# Patient Record
Sex: MALE | Race: WHITE | HISPANIC OR LATINO
[De-identification: names, ages, dates, MRNs, and addresses within clinical notes are randomized per-mention and may not be internally consistent; named-entity substitution may affect disease eponyms.]

---

## 2017-03-27 ENCOUNTER — APPOINTMENT (OUTPATIENT)
Dept: DERMATOLOGY | Facility: CLINIC | Age: 47
End: 2017-03-27

## 2017-05-18 ENCOUNTER — OUTPATIENT (OUTPATIENT)
Dept: OUTPATIENT SERVICES | Facility: HOSPITAL | Age: 47
LOS: 1 days | End: 2017-05-18
Payer: COMMERCIAL

## 2017-05-18 VITALS
OXYGEN SATURATION: 96 % | WEIGHT: 175.93 LBS | TEMPERATURE: 98 F | HEIGHT: 70 IN | SYSTOLIC BLOOD PRESSURE: 104 MMHG | RESPIRATION RATE: 18 BRPM | HEART RATE: 70 BPM | DIASTOLIC BLOOD PRESSURE: 60 MMHG

## 2017-05-18 VITALS
WEIGHT: 175.93 LBS | SYSTOLIC BLOOD PRESSURE: 104 MMHG | HEIGHT: 70 IN | DIASTOLIC BLOOD PRESSURE: 60 MMHG | HEART RATE: 70 BPM | TEMPERATURE: 98 F | RESPIRATION RATE: 16 BRPM

## 2017-05-18 DIAGNOSIS — Z98.890 OTHER SPECIFIED POSTPROCEDURAL STATES: Chronic | ICD-10-CM

## 2017-05-18 DIAGNOSIS — Z01.810 ENCOUNTER FOR PREPROCEDURAL CARDIOVASCULAR EXAMINATION: ICD-10-CM

## 2017-05-18 LAB
ANION GAP SERPL CALC-SCNC: 13 MMOL/L — SIGNIFICANT CHANGE UP (ref 5–17)
BUN SERPL-MCNC: 26 MG/DL — HIGH (ref 8–20)
CALCIUM SERPL-MCNC: 9.1 MG/DL — SIGNIFICANT CHANGE UP (ref 8.6–10.2)
CHLORIDE SERPL-SCNC: 103 MMOL/L — SIGNIFICANT CHANGE UP (ref 98–107)
CO2 SERPL-SCNC: 23 MMOL/L — SIGNIFICANT CHANGE UP (ref 22–29)
CREAT SERPL-MCNC: 0.8 MG/DL — SIGNIFICANT CHANGE UP (ref 0.5–1.3)
GLUCOSE SERPL-MCNC: 101 MG/DL — SIGNIFICANT CHANGE UP (ref 70–115)
HCT VFR BLD CALC: 43 % — SIGNIFICANT CHANGE UP (ref 42–52)
HGB BLD-MCNC: 14.8 G/DL — SIGNIFICANT CHANGE UP (ref 14–18)
INR BLD: 1.09 RATIO — SIGNIFICANT CHANGE UP (ref 0.88–1.16)
MAGNESIUM SERPL-MCNC: 2.3 MG/DL — SIGNIFICANT CHANGE UP (ref 1.6–2.6)
MCHC RBC-ENTMCNC: 30.8 PG — SIGNIFICANT CHANGE UP (ref 27–31)
MCHC RBC-ENTMCNC: 34.4 G/DL — SIGNIFICANT CHANGE UP (ref 32–36)
MCV RBC AUTO: 89.4 FL — SIGNIFICANT CHANGE UP (ref 80–94)
PLATELET # BLD AUTO: 164 K/UL — SIGNIFICANT CHANGE UP (ref 150–400)
POTASSIUM SERPL-MCNC: 4.2 MMOL/L — SIGNIFICANT CHANGE UP (ref 3.5–5.3)
POTASSIUM SERPL-SCNC: 4.2 MMOL/L — SIGNIFICANT CHANGE UP (ref 3.5–5.3)
PROTHROM AB SERPL-ACNC: 12 SEC — SIGNIFICANT CHANGE UP (ref 9.8–12.7)
RBC # BLD: 4.81 M/UL — SIGNIFICANT CHANGE UP (ref 4.6–6.2)
RBC # FLD: 12.6 % — SIGNIFICANT CHANGE UP (ref 11–15.6)
SODIUM SERPL-SCNC: 139 MMOL/L — SIGNIFICANT CHANGE UP (ref 135–145)
TYPE + AB SCN PNL BLD: SIGNIFICANT CHANGE UP
WBC # BLD: 4.4 K/UL — LOW (ref 4.8–10.8)
WBC # FLD AUTO: 4.4 K/UL — LOW (ref 4.8–10.8)

## 2017-05-18 PROCEDURE — 93010 ELECTROCARDIOGRAM REPORT: CPT

## 2017-05-18 PROCEDURE — 80048 BASIC METABOLIC PNL TOTAL CA: CPT

## 2017-05-18 PROCEDURE — 86850 RBC ANTIBODY SCREEN: CPT

## 2017-05-18 PROCEDURE — 85027 COMPLETE CBC AUTOMATED: CPT

## 2017-05-18 PROCEDURE — 93005 ELECTROCARDIOGRAM TRACING: CPT

## 2017-05-18 PROCEDURE — 36415 COLL VENOUS BLD VENIPUNCTURE: CPT

## 2017-05-18 PROCEDURE — G0463: CPT

## 2017-05-18 PROCEDURE — 86901 BLOOD TYPING SEROLOGIC RH(D): CPT

## 2017-05-18 PROCEDURE — 86900 BLOOD TYPING SEROLOGIC ABO: CPT

## 2017-05-18 PROCEDURE — 85610 PROTHROMBIN TIME: CPT

## 2017-05-18 PROCEDURE — 83735 ASSAY OF MAGNESIUM: CPT

## 2017-05-18 NOTE — H&P PST ADULT - HISTORY OF PRESENT ILLNESS
47 yo male without significant medical history who presents for [facility] for elective EPS/PVC ablation. He has had intermittent palpitations x 7 years, that have been increasing to daily over the last 3 months. He denies associated chest pain, LE edema, syncope or near syncope, but admits to feeling anxious and irritated with them. He denies caffeine or etoh. He was using marijuana recreationally but discontinued it a few months ago without change in his symptoms. He was prescribed flecainide but had a mix up with his medication and did not start it. He was taking atenolol without relief.    Holter Monitor 3/29/2017: 45% PVC burden  Nuclear stress 3/15/17: negative for ischemia  TTE 3/13/17: nml LVFx 45 yo male without significant medical history who presents for [facility] for elective EPS/PVC ablation. He has had intermittent palpitations x 7 years, that have been increasing to daily over the last 3 months. He denies associated chest pain, LE edema, syncope or near syncope, but admits to feeling anxious and irritated with them. He denies caffeine or etoh. He was using marijuana recreationally but discontinued it a few months ago without change in his symptoms. He was prescribed flecainide but had a mix up with his medication and did not start it. He was taking atenolol without relief.  Biological parents and siblings are alive and well, he denies family hx of SCD     Holter Monitor 3/29/2017: 45% PVC burden  Nuclear stress 3/15/17: negative for ischemia  TTE 3/13/17: nml LVFx

## 2017-05-18 NOTE — H&P PST ADULT - PMH
HLD (hyperlipidemia)  diet controlled  Palpitations    PVC (premature ventricular contraction)    Ventricular trigeminy

## 2017-05-18 NOTE — H&P PST ADULT - ASSESSMENT
47 yo male without significant medical history who presents for PST for elective EPS/PVC ablation. He has had intermittent palpitations x 7 years, that have been increasing to daily over the last 3 months. He denies associated chest pain, LE edema, syncope or near syncope, but admits to feeling anxious and irritated with them. He denies caffeine or etoh. He was using marijuana recreationally but discontinued it a few months ago without change in his symptoms. He was prescribed flecainide but had a mix up with his medication and did not start it. He was taking atenolol without relief.    _pt self discontinued atenolol x 5 days, will continue to hold for ablation

## 2017-05-23 ENCOUNTER — INPATIENT (INPATIENT)
Facility: HOSPITAL | Age: 47
LOS: 0 days | Discharge: HOME | DRG: 274 | End: 2017-05-24
Attending: STUDENT IN AN ORGANIZED HEALTH CARE EDUCATION/TRAINING PROGRAM | Admitting: STUDENT IN AN ORGANIZED HEALTH CARE EDUCATION/TRAINING PROGRAM
Payer: COMMERCIAL

## 2017-05-23 VITALS
OXYGEN SATURATION: 98 % | SYSTOLIC BLOOD PRESSURE: 127 MMHG | TEMPERATURE: 98 F | DIASTOLIC BLOOD PRESSURE: 59 MMHG | HEART RATE: 76 BPM | RESPIRATION RATE: 16 BRPM

## 2017-05-23 DIAGNOSIS — Z98.890 OTHER SPECIFIED POSTPROCEDURAL STATES: Chronic | ICD-10-CM

## 2017-05-23 DIAGNOSIS — Z01.810 ENCOUNTER FOR PREPROCEDURAL CARDIOVASCULAR EXAMINATION: ICD-10-CM

## 2017-05-23 DIAGNOSIS — I49.3 VENTRICULAR PREMATURE DEPOLARIZATION: ICD-10-CM

## 2017-05-23 LAB
BLD GP AB SCN SERPL QL: SIGNIFICANT CHANGE UP
TYPE + AB SCN PNL BLD: SIGNIFICANT CHANGE UP

## 2017-05-23 PROCEDURE — 93010 ELECTROCARDIOGRAM REPORT: CPT

## 2017-05-23 PROCEDURE — 93662 INTRACARDIAC ECG (ICE): CPT | Mod: 26

## 2017-05-23 PROCEDURE — 93655 ICAR CATH ABLTJ DSCRT ARRHYT: CPT

## 2017-05-23 PROCEDURE — 93654 COMPRE EP EVAL TX VT: CPT

## 2017-05-23 RX ORDER — ASPIRIN/CALCIUM CARB/MAGNESIUM 324 MG
325 TABLET ORAL DAILY
Qty: 0 | Refills: 0 | Status: DISCONTINUED | OUTPATIENT
Start: 2017-05-24 | End: 2017-05-24

## 2017-05-23 RX ORDER — ONDANSETRON 8 MG/1
4 TABLET, FILM COATED ORAL EVERY 6 HOURS
Qty: 0 | Refills: 0 | Status: DISCONTINUED | OUTPATIENT
Start: 2017-05-23 | End: 2017-05-24

## 2017-05-23 RX ORDER — ASPIRIN/CALCIUM CARB/MAGNESIUM 324 MG
1 TABLET ORAL
Qty: 30 | Refills: 0 | OUTPATIENT
Start: 2017-05-23 | End: 2017-06-22

## 2017-05-23 RX ORDER — BENZOCAINE AND MENTHOL 5; 1 G/100ML; G/100ML
1 LIQUID ORAL
Qty: 0 | Refills: 0 | Status: DISCONTINUED | OUTPATIENT
Start: 2017-05-23 | End: 2017-05-24

## 2017-05-23 RX ORDER — ATENOLOL 25 MG/1
1 TABLET ORAL
Qty: 0 | Refills: 0 | COMMUNITY

## 2017-05-23 RX ORDER — ACETAMINOPHEN 500 MG
650 TABLET ORAL EVERY 6 HOURS
Qty: 0 | Refills: 0 | Status: DISCONTINUED | OUTPATIENT
Start: 2017-05-23 | End: 2017-05-24

## 2017-05-23 NOTE — DISCHARGE NOTE ADULT - HOSPITAL COURSE
46 year old male with frequent PVCs, including sustained trigeminy and bigeminy, and 45% 24 hour PVC burden. He presented electively 5/23/17 and underwent uncomplicated LVOT PVC ablation. The patient was observed overnight without event and was discharged home the following morning with a plan for outpatient follow up.

## 2017-05-23 NOTE — DISCHARGE NOTE ADULT - CARE PROVIDER_API CALL
Richard Connors), Cardiac Electrophysiology; Cardiovascular Disease  [address] [place] [address]  Phone: [phone]  Fax: [phone]    Diaz Powell), Cardiology; Internal Medicine  [address]  [place] [address]  Phone: [phone]  Fax: [phone]

## 2017-05-23 NOTE — DISCHARGE NOTE ADULT - PATIENT PORTAL LINK FT
“You can access the Follow[facility] Patient Portal, offered by [facility], by registering with the following website: [email or web address]”

## 2017-05-23 NOTE — DISCHARGE NOTE ADULT - PLAN OF CARE
s/p ablation of LVOT PVCs - Bruising at the groin, sometimes extending down the leg, and/or a small lump under the skin at the groin access site is normal and will resolve within 2 – 3 weeks.   - Occasional skipped beats or palpitations that last for a few beats are common and generally resolve within 1-2 months.   - You make walk and take stairs at a regular pace.   - Do not perform any exercise more strenuous than walking for 1 week.   - Do not strain or lift heavy objects for 1 week.  - You may shower the day after the procedure.  - Do not soak in water (such as tub baths, hot tubs, swimming, etc.) for 1 week.   - You may resume all other activities the day after the procedure.  Call your doctor if:   - you notice bleeding, redness, drainage, swelling, increased tenderness or a hot sensation around the catheter insertion site.   - your temperature is greater than 100 degrees F for more than 24 hours.  - your rapid heart rhythm returns.  - you have any questions or concerns regarding the procedure.  If significant bleeding and/or a large lump (the size of a golf ball or bigger) occurs:  - Lie flat and apply continuous direct pressure just above the puncture site for at least 10 minutes  - If the issue resolves, notify your physician immediately.    - If the bleeding cannot be controlled, please seek immediate medical attention.  If you experience increased difficulty breathing or chest pain, or if you faint or have dizzy spells, please seek immediate medical attention.

## 2017-05-23 NOTE — DISCHARGE NOTE ADULT - CARE PROVIDERS DIRECT ADDRESSES
,[email or web address],[email or web address]

## 2017-05-23 NOTE — DISCHARGE NOTE ADULT - MEDICATION SUMMARY - MEDICATIONS TO TAKE
I will START or STAY ON the medications listed below when I get home from the hospital:    aspirin 325 mg oral tablet  -- 1 tab(s) by mouth once a day x 30 days  -- Indication: For Post Ablation

## 2017-05-23 NOTE — DISCHARGE NOTE ADULT - CARE PLAN
Principal Discharge DX:	PVC (premature ventricular contraction)  Goal:	s/p ablation of LVOT PVCs  Instructions for follow-up, activity and diet:	- Bruising at the groin, sometimes extending down the leg, and/or a small lump under the skin at the groin access site is normal and will resolve within 2 – 3 weeks.   - Occasional skipped beats or palpitations that last for a few beats are common and generally resolve within 1-2 months.   - You make walk and take stairs at a regular pace.   - Do not perform any exercise more strenuous than walking for 1 week.   - Do not strain or lift heavy objects for 1 week.  - You may shower the day after the procedure.  - Do not soak in water (such as tub baths, hot tubs, swimming, etc.) for 1 week.   - You may resume all other activities the day after the procedure.  Call your doctor if:   - you notice bleeding, redness, drainage, swelling, increased tenderness or a hot sensation around the catheter insertion site.   - your temperature is greater than 100 degrees F for more than 24 hours.  - your rapid heart rhythm returns.  - you have any questions or concerns regarding the procedure.  If significant bleeding and/or a large lump (the size of a golf ball or bigger) occurs:  - Lie flat and apply continuous direct pressure just above the puncture site for at least 10 minutes  - If the issue resolves, notify your physician immediately.    - If the bleeding cannot be controlled, please seek immediate medical attention.  If you experience increased difficulty breathing or chest pain, or if you faint or have dizzy spells, please seek immediate medical attention.

## 2017-05-23 NOTE — PROGRESS NOTE ADULT - SUBJECTIVE AND OBJECTIVE BOX
Patient seen today s/p ablation of outflow tract PVCs. No acute complaints post op. RFA closed with perclose device. Figure 8 sutures in right and left groin.     EKG: NSR    MEDICATIONS  (STANDING):    MEDICATIONS  (PRN):  ondansetron Injectable 4milliGRAM(s) IV Push every 6 hours PRN Nausea and/or Vomiting  benzocaine 15 mG/menthol 3.6 mG Lozenge 1Lozenge Oral four times a day PRN Sore Throat  acetaminophen   Tablet. 650milliGRAM(s) Oral every 6 hours PRN Mild Pain (1 - 3)  oxyCODONE  5 mG/acetaminophen 325 mG 1Tablet(s) Oral every 6 hours PRN Moderate Pain (4 - 6)    Allergies    No Known Allergies    PAST MEDICAL & SURGICAL HISTORY:  HLD (hyperlipidemia): diet controlled  Ventricular trigeminy  Chest fullness  Palpitations  PVC (premature ventricular contraction)  S/P hernia repair: inguinal  No significant past surgical history    Vital Signs Last 24 Hrs  T(C): 36.8, Max: 36.8 (05-23 @ 13:50)  T(F): 98.2, Max: 98.2 (05-23 @ 13:50)  HR: 60 (57 - 77)  BP: 105/66 (95/50 - 127/59)  RR: 12 (12 - 18)  SpO2: 96% (96% - 98%)    Physical Exam:  Constitutional: NAD, AAOx3  Cardiovascular: +S1S2 RRR  Pulmonary: CTA b/l, unlabored  GI: soft NTND +BS  Extremities: no pedal edema  Right and Left groin: no hematomas dressings intact  Neuro: non focal    A/P  46 year old male patient with no significant past medical history of normal LV systolic function s/p ablation of LVOT PVCs (between right and left commissures)    - Post Op per routine  - bedrest 6 hours  - stop beta blocker and Flecainide.   - resume diet  - figure 8 sutures to be removed in AM  - Follow up AM labs and EKG  - Discharge home tomorrow if stable

## 2017-05-24 VITALS — DIASTOLIC BLOOD PRESSURE: 69 MMHG | RESPIRATION RATE: 16 BRPM | SYSTOLIC BLOOD PRESSURE: 104 MMHG | HEART RATE: 58 BPM

## 2017-05-24 LAB
ANION GAP SERPL CALC-SCNC: 12 MMOL/L — SIGNIFICANT CHANGE UP (ref 5–17)
BUN SERPL-MCNC: 18 MG/DL — SIGNIFICANT CHANGE UP (ref 8–20)
CALCIUM SERPL-MCNC: 9 MG/DL — SIGNIFICANT CHANGE UP (ref 8.6–10.2)
CHLORIDE SERPL-SCNC: 102 MMOL/L — SIGNIFICANT CHANGE UP (ref 98–107)
CO2 SERPL-SCNC: 23 MMOL/L — SIGNIFICANT CHANGE UP (ref 22–29)
CREAT SERPL-MCNC: 0.89 MG/DL — SIGNIFICANT CHANGE UP (ref 0.5–1.3)
GLUCOSE SERPL-MCNC: 104 MG/DL — SIGNIFICANT CHANGE UP (ref 70–115)
HCT VFR BLD CALC: 42.9 % — SIGNIFICANT CHANGE UP (ref 42–52)
HGB BLD-MCNC: 14.7 G/DL — SIGNIFICANT CHANGE UP (ref 14–18)
MAGNESIUM SERPL-MCNC: 2.1 MG/DL — SIGNIFICANT CHANGE UP (ref 1.6–2.6)
MCHC RBC-ENTMCNC: 30.5 PG — SIGNIFICANT CHANGE UP (ref 27–31)
MCHC RBC-ENTMCNC: 34.3 G/DL — SIGNIFICANT CHANGE UP (ref 32–36)
MCV RBC AUTO: 89 FL — SIGNIFICANT CHANGE UP (ref 80–94)
PLATELET # BLD AUTO: 153 K/UL — SIGNIFICANT CHANGE UP (ref 150–400)
POTASSIUM SERPL-MCNC: 4.1 MMOL/L — SIGNIFICANT CHANGE UP (ref 3.5–5.3)
POTASSIUM SERPL-SCNC: 4.1 MMOL/L — SIGNIFICANT CHANGE UP (ref 3.5–5.3)
RBC # BLD: 4.82 M/UL — SIGNIFICANT CHANGE UP (ref 4.6–6.2)
RBC # FLD: 12.6 % — SIGNIFICANT CHANGE UP (ref 11–15.6)
SODIUM SERPL-SCNC: 137 MMOL/L — SIGNIFICANT CHANGE UP (ref 135–145)
WBC # BLD: 5.3 K/UL — SIGNIFICANT CHANGE UP (ref 4.8–10.8)
WBC # FLD AUTO: 5.3 K/UL — SIGNIFICANT CHANGE UP (ref 4.8–10.8)

## 2017-05-24 PROCEDURE — 93621 COMP EP EVL L PAC&REC C SINS: CPT

## 2017-05-24 PROCEDURE — 85027 COMPLETE CBC AUTOMATED: CPT

## 2017-05-24 PROCEDURE — 86901 BLOOD TYPING SEROLOGIC RH(D): CPT

## 2017-05-24 PROCEDURE — C1894: CPT

## 2017-05-24 PROCEDURE — 86900 BLOOD TYPING SEROLOGIC ABO: CPT

## 2017-05-24 PROCEDURE — 93656 COMPRE EP EVAL ABLTJ ATR FIB: CPT

## 2017-05-24 PROCEDURE — 93010 ELECTROCARDIOGRAM REPORT: CPT

## 2017-05-24 PROCEDURE — 93654 COMPRE EP EVAL TX VT: CPT

## 2017-05-24 PROCEDURE — 83735 ASSAY OF MAGNESIUM: CPT

## 2017-05-24 PROCEDURE — 86850 RBC ANTIBODY SCREEN: CPT

## 2017-05-24 PROCEDURE — 86920 COMPATIBILITY TEST SPIN: CPT

## 2017-05-24 PROCEDURE — C1730: CPT

## 2017-05-24 PROCEDURE — 80048 BASIC METABOLIC PNL TOTAL CA: CPT

## 2017-05-24 PROCEDURE — C1732: CPT

## 2017-05-24 PROCEDURE — 93005 ELECTROCARDIOGRAM TRACING: CPT

## 2017-05-24 RX ORDER — ATENOLOL 25 MG/1
1 TABLET ORAL
Qty: 0 | Refills: 0 | COMMUNITY

## 2017-05-24 RX ORDER — ASPIRIN/CALCIUM CARB/MAGNESIUM 324 MG
1 TABLET ORAL
Qty: 0 | Refills: 0 | COMMUNITY

## 2017-05-24 RX ORDER — LOSARTAN POTASSIUM 100 MG/1
1 TABLET, FILM COATED ORAL
Qty: 0 | Refills: 0 | COMMUNITY

## 2017-05-24 NOTE — PROGRESS NOTE ADULT - SUBJECTIVE AND OBJECTIVE BOX
Pt doing well POD #1 s/p *** ablation. Denies complaint.     EKG:  TELE:    MEDICATIONS  (STANDING):  aspirin 325milliGRAM(s) Oral daily    MEDICATIONS  (PRN):  ondansetron Injectable 4milliGRAM(s) IV Push every 6 hours PRN Nausea and/or Vomiting  benzocaine 15 mG/menthol 3.6 mG Lozenge 1Lozenge Oral four times a day PRN Sore Throat  acetaminophen   Tablet. 650milliGRAM(s) Oral every 6 hours PRN Mild Pain (1 - 3)  oxyCODONE  5 mG/acetaminophen 325 mG 1Tablet(s) Oral every 6 hours PRN Moderate Pain (4 - 6)      Allergies    No Known Allergies    Intolerances      PAST MEDICAL & SURGICAL HISTORY:  HLD (hyperlipidemia): diet controlled  Ventricular trigeminy  Chest fullness  Palpitations  PVC (premature ventricular contraction)  S/P hernia repair: inguinal  No significant past surgical history      Vital Signs Last 24 Hrs  T(C): 36.8, Max: 36.8 (05-23 @ 13:50)  T(F): 98.2, Max: 98.2 (05-23 @ 13:50)  HR: 58 (57 - 88)  BP: 104/69 (95/50 - 127/59)  BP(mean): --  RR: 16 (12 - 20)  SpO2: 98% (96% - 99%)    Physical Exam:  Constitutional: NAD, AAOx3  Cardiovascular: +S1S2 RRR  Pulmonary: CTA b/l, unlabored  Abd: soft NTND +BS  Groins: C/D/I bilaterally; no bleeding, hematoma, edema  Extremities: no pedal edema, +distal pulses b/l  Neuro: non focal, ALLEN x4    LABS:                        14.7   5.3   )-----------( 153      ( 24 May 2017 06:15 )             42.9     05-24    137  |  102  |  18.0  ----------------------------<  104  4.1   |  23.0  |  0.89    Ca    9.0      24 May 2017 06:15  Mg     2.1     05-24            Assessment:   *** y/o female h/o ***; now POD #1 s/p ***.     Plan:     Access site care and activity limitations reviewed w/ pt.   Outpt f/up in 4-6 weeks. Pt doing well POD #1 s/p LVOT PVC ablation. Denies complaint.     EKG: Sinus rhythm at 58bpm, mild IVCD (QRS =100 ms, notching II, III, aVF, V1-V2), normal axis & intervals.   TELE: sinus rhythm w/ intact conduction, no PVCs, no acute changes or sustained arrhythmias.     MEDICATIONS  (STANDING):  aspirin 325milliGRAM(s) Oral daily    MEDICATIONS  (PRN):  ondansetron Injectable 4milliGRAM(s) IV Push every 6 hours PRN Nausea and/or Vomiting  benzocaine 15 mG/menthol 3.6 mG Lozenge 1Lozenge Oral four times a day PRN Sore Throat  acetaminophen   Tablet. 650milliGRAM(s) Oral every 6 hours PRN Mild Pain (1 - 3)  oxyCODONE  5 mG/acetaminophen 325 mG 1Tablet(s) Oral every 6 hours PRN Moderate Pain (4 - 6)      Allergies  No Known Allergies      PAST MEDICAL & SURGICAL HISTORY:  HLD (hyperlipidemia): diet controlled  Ventricular trigeminy  Chest fullness  Palpitations  PVC (premature ventricular contraction)  S/P hernia repair: inguinal  No significant past surgical history      Vital Signs Last 24 Hrs  T(C): 36.8, Max: 36.8 (05-23 @ 13:50)  T(F): 98.2, Max: 98.2 (05-23 @ 13:50)  HR: 58 (57 - 88)  BP: 104/69 (95/50 - 127/59)  BP(mean): --  RR: 16 (12 - 20)  SpO2: 98% (96% - 99%)    Physical Exam:  Constitutional: NAD, AAOx3  Cardiovascular: +S1S2 RRR  Pulmonary: CTA b/l, unlabored  Abd: soft NTND +BS  Groins: hemostatic sutures removed, sites C/D/I bilaterally; no bleeding, hematoma, edema  Extremities: no pedal edema, +distal pulses b/l  Neuro: non focal, ALLEN x4    LABS:                        14.7   5.3   )-----------( 153      ( 24 May 2017 06:15 )             42.9     05-24    137  |  102  |  18.0  ----------------------------<  104  4.1   |  23.0  |  0.89    Ca    9.0      24 May 2017 06:15  Mg     2.1     05-24      Assessment:   47 y/o male h/o frequent PVCs, including sustained trigeminy & bigeminy; now POD #1 s/p RF ablation of LVOT PVCs.     Plan:   Aspirin 325mg daily x 1 month.   Beta blocker d/c'd.   Access site care and activity limitations reviewed w/ pt.   Outpt f/up w/ Dr. Powell as scheduled.  Ok to d/c pt home. Pt doing well POD #1 s/p LVOT PVC ablation. Denies complaint.     EKG: Sinus rhythm at 58bpm, mild IVCD (QRS =100 ms, notching II, III, aVF, V1-V2), normal axis & intervals.   TELE: sinus rhythm w/ intact conduction, no PVCs, no acute changes or sustained arrhythmias.     MEDICATIONS  (STANDING):  aspirin 325milliGRAM(s) Oral daily    MEDICATIONS  (PRN):  ondansetron Injectable 4milliGRAM(s) IV Push every 6 hours PRN Nausea and/or Vomiting  benzocaine 15 mG/menthol 3.6 mG Lozenge 1Lozenge Oral four times a day PRN Sore Throat  acetaminophen   Tablet. 650milliGRAM(s) Oral every 6 hours PRN Mild Pain (1 - 3)  oxyCODONE  5 mG/acetaminophen 325 mG 1Tablet(s) Oral every 6 hours PRN Moderate Pain (4 - 6)      Allergies  No Known Allergies      PAST MEDICAL & SURGICAL HISTORY:  HLD (hyperlipidemia): diet controlled  Ventricular trigeminy  Chest fullness  Palpitations  PVC (premature ventricular contraction)  S/P hernia repair: inguinal  No significant past surgical history      Vital Signs Last 24 Hrs  T(C): 36.8, Max: 36.8 (05-23 @ 13:50)  T(F): 98.2, Max: 98.2 (05-23 @ 13:50)  HR: 58 (57 - 88)  BP: 104/69 (95/50 - 127/59)  BP(mean): --  RR: 16 (12 - 20)  SpO2: 98% (96% - 99%)    Physical Exam:  Constitutional: NAD, AAOx3  Cardiovascular: +S1S2 RRR  Pulmonary: CTA b/l, unlabored  Abd: soft NTND +BS  Groins: hemostatic sutures removed, sites C/D/I bilaterally; no bleeding, hematoma, edema  Extremities: no pedal edema, +distal pulses b/l  Neuro: non focal, ALLEN x4    LABS:                        14.7   5.3   )-----------( 153      ( 24 May 2017 06:15 )             42.9     05-24    137  |  102  |  18.0  ----------------------------<  104  4.1   |  23.0  |  0.89    Ca    9.0      24 May 2017 06:15  Mg     2.1     05-24      Assessment:   47 y/o male h/o frequent PVCs, including sustained trigeminy & bigeminy; now POD #1 s/p RF ablation of LVOT PVCs.     Plan:   Aspirin 325mg daily x 1 month.   Beta blocker d/c'd.   Access site care and activity limitations reviewed w/ pt.   Outpt f/up w/  in 2 -3 weeks - pt aware to call for appt.  Ok to d/c pt home.

## 2018-09-07 ENCOUNTER — TRANSCRIPTION ENCOUNTER (OUTPATIENT)
Age: 48
End: 2018-09-07

## 2019-01-15 ENCOUNTER — TRANSCRIPTION ENCOUNTER (OUTPATIENT)
Age: 49
End: 2019-01-15

## 2019-03-28 PROBLEM — I49.8 OTHER SPECIFIED CARDIAC ARRHYTHMIAS: Chronic | Status: ACTIVE | Noted: 2017-05-18

## 2019-03-28 PROBLEM — E78.5 HYPERLIPIDEMIA, UNSPECIFIED: Chronic | Status: ACTIVE | Noted: 2017-05-18

## 2019-03-28 PROBLEM — R00.2 PALPITATIONS: Chronic | Status: ACTIVE | Noted: 2017-05-18

## 2019-03-28 PROBLEM — I49.3 VENTRICULAR PREMATURE DEPOLARIZATION: Chronic | Status: ACTIVE | Noted: 2017-05-18

## 2019-04-08 ENCOUNTER — APPOINTMENT (OUTPATIENT)
Dept: NEUROSURGERY | Facility: CLINIC | Age: 49
End: 2019-04-08
Payer: COMMERCIAL

## 2019-04-08 VITALS
HEIGHT: 70 IN | DIASTOLIC BLOOD PRESSURE: 77 MMHG | BODY MASS INDEX: 25.05 KG/M2 | SYSTOLIC BLOOD PRESSURE: 115 MMHG | WEIGHT: 175 LBS | HEART RATE: 77 BPM

## 2019-04-08 DIAGNOSIS — M50.90 CERVICAL DISC DISORDER, UNSPECIFIED, UNSPECIFIED CERVICAL REGION: ICD-10-CM

## 2019-04-08 DIAGNOSIS — M54.2 CERVICALGIA: ICD-10-CM

## 2019-04-08 PROCEDURE — 99204 OFFICE O/P NEW MOD 45 MIN: CPT

## 2019-04-08 NOTE — HISTORY OF PRESENT ILLNESS
[de-identified] : Supplies and 48-year-old gentleman here for evaluation of neck pain. Denies any specific injury. He has pain mostly in her neck and her shoulders. He has no symptoms of myelopathy. He has not done any treatment for the cervical spine at this point. His otherwise health he enjoys soccer.  He denies clumsiness or gait difficulties. He often does get headaches at times associated with his neck pain.\par \par

## 2019-04-08 NOTE — REASON FOR VISIT
[New Patient Visit] : a new patient visit [FreeTextEntry1] : Patient is here lower back and neck pain. he had imaging done at [facility].

## 2019-04-08 NOTE — PLAN
[FreeTextEntry1] : DIAGNOSIS:  CERVICAL SPONDYLOSIS\par TREATMENT PLAN:  NON-SURGICAL\par \par This is a patient with cervical spondylosis.  I have recommended nonsurgical management at this time.  This consists of physical therapy and/or manual medicine in conjunction to medical therapy and other conservative methods.  These include the consideration of trigger point injections and or the utilization of modalities such as TENS where applicable.  The next tier would be the referral to a pain management specialist (anesthesia or physiatry) for the consideration of spinal injections.  This includes the options of epidural steroids, facet injections as well as other novel techniques that may provide pain relief.  Although there is indeed evidence of disk degeneration on imaging, discectomy or spinal fusion for the sole purposes of treating neck pain in the absence of a compressive lesion or neurological issue is associated with poor outcomes.   \par \par I have reviewed the images in detail with the patient today in my office and have answered all questions regarding this condition to the best of my ability to the patient’s satisfaction.\par

## 2019-04-08 NOTE — RESULTS/DATA
[FreeTextEntry1] : [facility] MRI of the brain and cervical spine were reviewed in detail. Essentially both normal studies. Cervical spine report suggests bulging discs at multiple levels but these are maybe 1 mm and are not causing any cord contact and there is no evidence of any neural compression whatsoever.

## 2019-04-22 ENCOUNTER — EMERGENCY (EMERGENCY)
Facility: HOSPITAL | Age: 49
LOS: 1 days | End: 2019-04-22
Admitting: EMERGENCY MEDICINE
Payer: COMMERCIAL

## 2019-04-22 DIAGNOSIS — Z98.890 OTHER SPECIFIED POSTPROCEDURAL STATES: Chronic | ICD-10-CM

## 2019-04-22 PROCEDURE — 99285 EMERGENCY DEPT VISIT HI MDM: CPT

## 2019-04-22 PROCEDURE — 71046 X-RAY EXAM CHEST 2 VIEWS: CPT | Mod: 26

## 2019-04-22 PROCEDURE — 93010 ELECTROCARDIOGRAM REPORT: CPT

## 2019-04-22 PROCEDURE — 70450 CT HEAD/BRAIN W/O DYE: CPT | Mod: 26

## 2020-01-19 ENCOUNTER — TRANSCRIPTION ENCOUNTER (OUTPATIENT)
Age: 50
End: 2020-01-19

## 2022-06-15 ENCOUNTER — EMERGENCY (EMERGENCY)
Facility: HOSPITAL | Age: 52
LOS: 1 days | Discharge: HOME | End: 2022-06-15
Admitting: EMERGENCY MEDICINE
Payer: COMMERCIAL

## 2022-06-15 DIAGNOSIS — R42 DIZZINESS AND GIDDINESS: ICD-10-CM

## 2022-06-15 DIAGNOSIS — Z98.890 OTHER SPECIFIED POSTPROCEDURAL STATES: Chronic | ICD-10-CM

## 2022-06-15 PROCEDURE — 71045 X-RAY EXAM CHEST 1 VIEW: CPT | Mod: 26

## 2022-06-15 PROCEDURE — 70450 CT HEAD/BRAIN W/O DYE: CPT | Mod: 26,MA

## 2022-06-15 PROCEDURE — 99285 EMERGENCY DEPT VISIT HI MDM: CPT

## 2022-06-15 PROCEDURE — 93010 ELECTROCARDIOGRAM REPORT: CPT

## 2022-06-30 ENCOUNTER — EMERGENCY (EMERGENCY)
Facility: HOSPITAL | Age: 52
LOS: 1 days | Discharge: HOME | End: 2022-06-30
Admitting: EMERGENCY MEDICINE
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
## 2022-06-30 DIAGNOSIS — R21 RASH AND OTHER NONSPECIFIC SKIN ERUPTION: ICD-10-CM

## 2022-06-30 DIAGNOSIS — S30.861A INSECT BITE (NONVENOMOUS) OF ABDOMINAL WALL, INITIAL ENCOUNTER: ICD-10-CM

## 2022-06-30 DIAGNOSIS — Z98.890 OTHER SPECIFIED POSTPROCEDURAL STATES: Chronic | ICD-10-CM
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
## 2022-06-30 PROCEDURE — 99283 EMERGENCY DEPT VISIT LOW MDM: CPT

## 2023-09-21 ENCOUNTER — OFFICE (OUTPATIENT)
Dept: URBAN - METROPOLITAN AREA CLINIC 105 | Facility: CLINIC | Age: 53
Setting detail: OPHTHALMOLOGY
End: 2023-09-21
Payer: COMMERCIAL

## 2023-09-21 ENCOUNTER — RX ONLY (RX ONLY)
Age: 53
End: 2023-09-21

## 2023-09-21 DIAGNOSIS — T15.01XA: ICD-10-CM

## 2023-09-21 DIAGNOSIS — H16.223: ICD-10-CM

## 2023-09-21 DIAGNOSIS — H01.004: ICD-10-CM

## 2023-09-21 DIAGNOSIS — H01.001: ICD-10-CM

## 2023-09-21 PROCEDURE — 99203 OFFICE O/P NEW LOW 30 MIN: CPT | Performed by: REGISTERED NURSE

## 2023-09-21 ASSESSMENT — REFRACTION_MANIFEST
OD_SPHERE: PLANO
OS_VA2: 20/20
OD_ADD: +2.00
OD_VA2: 20/20
OS_SPHERE: PLANO
OS_ADD: +2.00

## 2023-09-21 ASSESSMENT — KERATOMETRY
OS_AXISANGLE_DEGREES: 093
METHOD_AUTO_MANUAL: AUTO
OS_K1POWER_DIOPTERS: 42.00
OS_K2POWER_DIOPTERS: 42.50
OD_K2POWER_DIOPTERS: 42.50
OD_K1POWER_DIOPTERS: 41.75
OD_AXISANGLE_DEGREES: 097

## 2023-09-21 ASSESSMENT — CORNEAL TRAUMA - ABRASION
OD_ABRASION: ABSENT
OS_ABRASION: ABSENT

## 2023-09-21 ASSESSMENT — TONOMETRY
OD_IOP_MMHG: 17
OS_IOP_MMHG: 13

## 2023-09-21 ASSESSMENT — LID EXAM ASSESSMENTS
OD_BLEPHARITIS: RUL
OS_BLEPHARITIS: LUL

## 2023-09-21 ASSESSMENT — VISUAL ACUITY
OS_BCVA: 20/20
OD_BCVA: 20/20

## 2023-09-21 ASSESSMENT — SUPERFICIAL PUNCTATE KERATITIS (SPK)
OD_SPK: T
OS_SPK: T

## 2023-09-21 ASSESSMENT — REFRACTION_CURRENTRX
OS_VPRISM_DIRECTION: PROGS
OS_ADD: +1.75
OS_AXIS: 087
OD_OVR_VA: 20/
OD_AXIS: 121
OS_SPHERE: +1.50
OS_OVR_VA: 20/
OS_CYLINDER: -0.50
OD_SPHERE: +1.25
OD_ADD: +1.75
OD_VPRISM_DIRECTION: PROGS
OD_CYLINDER: -0.50

## 2023-09-21 ASSESSMENT — REFRACTION_AUTOREFRACTION
OD_AXIS: 158
OS_AXIS: 0.0
OD_CYLINDER: -0.25
OS_SPHERE: +1.00
OS_CYLINDER: 0.00
OD_SPHERE: +1.00

## 2023-09-21 ASSESSMENT — CONFRONTATIONAL VISUAL FIELD TEST (CVF)
OS_FINDINGS: FULL
OD_FINDINGS: FULL

## 2023-09-21 ASSESSMENT — AXIALLENGTH_DERIVED
OD_AL: 23.7572
OS_AL: 23.6616

## 2023-09-21 ASSESSMENT — SPHEQUIV_DERIVED
OD_SPHEQUIV: 0.875
OS_SPHEQUIV: 1

## 2023-09-21 ASSESSMENT — CORNEAL TRAUMA - FOREIGN BODY
OD_FOREIGNBODY: PRESENT
OS_FOREIGNBODY: ABSENT

## 2024-05-16 NOTE — DISCHARGE NOTE ADULT - IF YOU ARE A SMOKER, IT IS IMPORTANT FOR YOUR HEALTH TO STOP SMOKING. PLEASE BE AWARE THAT SECOND HAND SMOKE IS ALSO HARMFUL.
[de-identified] :      08/08/23: NSR with LAFB and PVC. 07/31/23: NSR with PVC (RBRS axis, likely papillary muscle exit).  [de-identified] :    . a 05/17-05/24/23: 14.9% PVC burden of single morphology. No AT/AF. PSVT, NSVT.  04/2023 Holter: 7.5% PVC burden, asymptomatic.  [de-identified] :  06/23/23 TTE: LVEF: 55-60%. Basal-mid inferolateral wall akinetic. Normal RV size and function. LA normal (LAd 3.6 cm). Mild TR.  Statement Selected [de-identified] :  08/02/23 Cardiac MRI: LVEF: 42%. No LGE to suggest ischemia, scar or fibrosis.  [de-identified] :  2015: Patent stents in LAD & LCx. RCA . ROQUE to OM1.

## 2024-08-12 NOTE — ASU PATIENT PROFILE, ADULT - HEALTH/HEALTHCARE ANXIETIES, PROFILE
Patient blood pressure has decreased.  Patient will follow primary care physician.  Patient given strict return precautions. none